# Patient Record
(demographics unavailable — no encounter records)

---

## 2024-10-17 NOTE — HISTORY OF PRESENT ILLNESS
[Post-hospitalization from ___ Hospital] : Post-hospitalization from [unfilled] Hospital [Admitted on: ___] : The patient was admitted on [unfilled] [Discharged on ___] : discharged on [unfilled] [Discharge Summary] : discharge summary [Discharge Med List] : discharge medication list [FreeTextEntry2] : recent admission pneumonia atrial fibrillation decline anti coag but on asa

## 2024-10-17 NOTE — PLAN
[FreeTextEntry1] : afibon asa complain of vision jaiden ges from low dose ,etoprolol will switch  to cardizem 30 bid f/u 4 week

## 2024-10-17 NOTE — HEALTH RISK ASSESSMENT
[Yes] : Yes [Monthly or less (1 pt)] : Monthly or less (1 point) [Never (0 pts)] : Never (0 points) [No falls in past year] : Patient reported no falls in the past year [0] : 2) Feeling down, depressed, or hopeless: Not at all (0) [PHQ-2 Negative - No further assessment needed] : PHQ-2 Negative - No further assessment needed [[id number]] : 0 [Change in mental status noted] : No change in mental status noted [Language] : denies difficulty with language [Behavior] : denies difficulty with behavior [Learning/Retaining New Information] : denies difficulty learning/retaining new information [Handling Complex Tasks] : denies difficulty handling complex tasks [Reasoning] : denies difficulty with reasoning [Spatial Ability and Orientation] : denies difficulty with spatial ability and orientation [None] : None [Alone] : lives alone [Retired] : retired [College] : College [] :  [Feels Safe at Home] : Feels safe at home [Fully functional (bathing, dressing, toileting, transferring, walking, feeding)] : Fully functional (bathing, dressing, toileting, transferring, walking, feeding) [Fully functional (using the telephone, shopping, preparing meals, housekeeping, doing laundry, using] : Fully functional and needs no help or supervision to perform IADLs (using the telephone, shopping, preparing meals, housekeeping, doing laundry, using transportation, managing medications and managing finances) [Reports changes in hearing] : Reports no changes in hearing [Reports changes in vision] : Reports no changes in vision [Reports changes in dental health] : Reports no changes in dental health [Smoke Detector] : smoke detector [Carbon Monoxide Detector] : carbon monoxide detector [Seat Belt] :  uses seat belt

## 2024-10-30 NOTE — PLAN
[FreeTextEntry1] : 6-19-24: Plan: BLE: wash with soap and water adaptic/xtrasorb/safia/ace 3x/week supplies ordered  will order home care  will order vascular testing (MARCUS/PVR, VLE, IVC scan)  The patient was counseled as to the signs and symptoms of infection, and instructed in the event they suspect new or worsening infection or if the patient is significantly ill (fever, altered mental status, etc.), to present to the nearest ED.  f/u 1 week   7-10-24: Plan: BLE: wash with soap and water adaptic/xtrasorb/safia/ace 3x/week Nursing orders given The patient was counseled as to the signs and symptoms of infection, and instructed in the event they suspect new or worsening infection or if the patient is significantly ill (fever, altered mental status, etc.), to present to the nearest ED.  for VLE and IVC scan f/u 2-3 weeks   7-19-24: Plan: wash with soap and water adaptic/drawtex/abd/safia/ace 3x/week Nursing orders given The patient was counseled as to the signs and symptoms of infection, and instructed in the event they suspect new or worsening infection or if the patient is significantly ill (fever, altered mental status, etc.), to present to the nearest ED.  Pt has appt scheduled for 8-7-24 8-7-24: Plan: Pt open to  vascular testing.  Will reorder.   will order vascular testing (VLE, IVC scan)  wash with soap and water adaptic/drawtex/abd/safia/ace 3x/week Nursing orders given The patient was counseled as to the signs and symptoms of infection, and instructed in the event they suspect new or worsening infection or if the patient is significantly ill (fever, altered mental status, etc.), to present to the nearest ED.  f/u 2-3 weeks   8/28/24 Plan: Vascular testing today for VLE IVC scheduled for next visit Wash with soap and water Moisturize dry scaly areas Adaptic/drawtex/abd/safia/ace 3x/week Nursing orders given for [facility]  10-30-24: Plan: BLE Wash with soap and water Moisturize dry scaly areas Adaptic/drawtex/abd/safia/ace 3x/week Nursing orders given for [facility] follow up in 3-4 weeks

## 2024-10-30 NOTE — PHYSICAL EXAM
[Ankle Swelling (On Exam)] : present [Ankle Swelling Bilaterally] : bilaterally  [Skin Ulcer] : ulcer [Alert] : alert [Oriented to Person] : oriented to person [Oriented to Place] : oriented to place [Oriented to Time] : oriented to time [Calm] : calm [Please See PDF for Tissue Analytics] : Please See PDF for Tissue Analytics. [1+] : left 1+ [de-identified] : NAD [de-identified] : AT [de-identified] : supple [de-identified] : equal chest rise [FreeTextEntry1] : right greater than left [de-identified] : FROM [de-identified] : bilateral lower extremity superficial ulcerative wounds with sloughing and dry flaky skin

## 2024-10-30 NOTE — HISTORY OF PRESENT ILLNESS
[FreeTextEntry1] : 6-19-24 Mr. GARLAND SLOAN   presents to the office with a wound for 6 months.  The wound is located on  the BLE.  The patient has complaints of non healing and drainage.   The patient has been dressing the wound with wash cloth and tape.  The patient denies fevers or chills.  The patient has localized pain to the wound upon dressing changes.  The patient has no other complaints or associated symptoms.   Pt treated with abx (doxy ) by PMD on 6-3-24. Pt has seen Dr. Mora's office ([facility]) last in 2-2024 and had VLE--  severe superficial venous insuff.  Recommended compression and elevation  Pt here for worsening wounds over last 6 months.   10-30-24 Pt presents for follow up for bilateral lower extremity wounds. He was recently hospitalized for pneumonia. He was seen by the wound care service there who recommended sonia to his wounds.

## 2024-11-13 NOTE — HISTORY OF PRESENT ILLNESS
[FreeTextEntry1] : f/u [de-identified] : chronic issue with leg goes to wound care on diuretic increase weeping and swelling afib did not like Cardizem does not like low dose metoprolol neuropathy control cymbalta 20

## 2024-11-13 NOTE — PHYSICAL EXAM
[Normal] : no respiratory distress, lungs were clear to auscultation bilaterally and no accessory muscle use [de-identified] : irregualr att 80 [de-identified] : 2 plus weeping edema

## 2024-11-13 NOTE — PLAN
[FreeTextEntry1] : stop metoprolol start dig 0.125 every other day no loading dose will check level and heart rate at next visit check renal function, potassium, magnesium continue water pills as is f/u wound care f/u 2 months

## 2024-11-20 NOTE — PLAN
[FreeTextEntry1] : 6-19-24: Plan: BLE: wash with soap and water adaptic/xtrasorb/safia/ace 3x/week supplies ordered  will order home care  will order vascular testing (MARCUS/PVR, VLE, IVC scan)  The patient was counseled as to the signs and symptoms of infection, and instructed in the event they suspect new or worsening infection or if the patient is significantly ill (fever, altered mental status, etc.), to present to the nearest ED.  f/u 1 week   7-10-24: Plan: BLE: wash with soap and water adaptic/xtrasorb/safia/ace 3x/week Nursing orders given The patient was counseled as to the signs and symptoms of infection, and instructed in the event they suspect new or worsening infection or if the patient is significantly ill (fever, altered mental status, etc.), to present to the nearest ED.  for VLE and IVC scan f/u 2-3 weeks   7-19-24: Plan: wash with soap and water adaptic/drawtex/abd/safia/ace 3x/week Nursing orders given The patient was counseled as to the signs and symptoms of infection, and instructed in the event they suspect new or worsening infection or if the patient is significantly ill (fever, altered mental status, etc.), to present to the nearest ED.  Pt has appt scheduled for 8-7-24 8-7-24: Plan: Pt open to  vascular testing.  Will reorder.   will order vascular testing (VLE, IVC scan)  wash with soap and water adaptic/drawtex/abd/safia/ace 3x/week Nursing orders given The patient was counseled as to the signs and symptoms of infection, and instructed in the event they suspect new or worsening infection or if the patient is significantly ill (fever, altered mental status, etc.), to present to the nearest ED.  f/u 2-3 weeks   8/28/24 Plan: Vascular testing today for VLE IVC scheduled for next visit Wash with soap and water Moisturize dry scaly areas Adaptic/drawtex/abd/safia/ace 3x/week Nursing orders given for [facility]  10-30-24: Plan: BLE Wash with soap and water Moisturize dry scaly areas Adaptic/drawtex/abd/safia/ace 3x/week Nursing orders given for [facility] follow up in 3-4 weeks  11-20-24: Plan: BLE cont current tx Wash with soap and water Moisturize dry scaly areas Adaptic/drawtex/abd/safia/ace 3x/week Nursing orders given for 3x/week  follow up in 3-4 weeks

## 2024-11-20 NOTE — PHYSICAL EXAM
[1+] : left 1+ [Ankle Swelling (On Exam)] : present [Ankle Swelling Bilaterally] : bilaterally  [Skin Ulcer] : ulcer [Alert] : alert [Oriented to Person] : oriented to person [Oriented to Place] : oriented to place [Oriented to Time] : oriented to time [Calm] : calm [Please See PDF for Tissue Analytics] : Please See PDF for [employer] Analytics. [de-identified] : NAD [de-identified] : AT [de-identified] : supple [de-identified] : equal chest rise [FreeTextEntry1] : right greater than left [de-identified] : FROM [de-identified] : bilateral lower extremities with scattered superficial areas of denuded skin. dry flaky skin.

## 2024-11-20 NOTE — HISTORY OF PRESENT ILLNESS
[FreeTextEntry1] : 6-19-24 Mr. GARLAND SLOAN   presents to the office with a wound for 6 months.  The wound is located on  the BLE.  The patient has complaints of non healing and drainage.   The patient has been dressing the wound with wash cloth and tape.  The patient denies fevers or chills.  The patient has localized pain to the wound upon dressing changes.  The patient has no other complaints or associated symptoms.   Pt treated with abx (doxy ) by PMD on 6-3-24. Pt has seen Dr. Mora's office ([facility]) last in 2-2024 and had VLE--  severe superficial venous insuff.  Recommended compression and elevation  Pt here for worsening wounds over last 6 months.   10-30-24 Pt presents for follow up for bilateral lower extremity wounds. He was recently hospitalized for pneumonia. He was seen by the wound care service there who recommended sonia to his wounds.   11-20-24 Pt presents for follow up of BLE wounds. He states that the nurses have only been coming twice a week. No new complaints.

## 2024-11-20 NOTE — ASSESSMENT
[FreeTextEntry1] : 6-19-24: Pt her for BLE wounds On exam: BLE edema BLE wounds with slough and erythema in gator region.  s/p mechanical debridement s/p excisional debridement of LLE No periwound warmth, tenderness, induration, fluctuance or crepitus.   7-10-24: Pt here for f/u No new complaints Pt had vascular testing today.  Dr. Martins discussed results with pt today.  Has home care 3x/week On exam: BLE edema BLE wounds: superficial wounds with scaly skin. s/p mechanical debridement No s/s of infection.   7-19-24: Pt here for f/u Pt says dressings are wet before due for next nurse visit. Has home care 3x/week Pt cancelled VLE today-- said he is having too many tests and does not want it done.  Explained to pt that the test is necessary to help best guide the care and management of his leg wounds.  Pt still refuses exam (VLE) On exam: BLE: superficial wounds with dry scaling skin.  s/p mechanical debridement. stable erythema w/o warmth, tenderness, induration, fluctuance or crepitus.  calves soft NT  8-7-24: Pt here for f/u Pt has complaint of drainage. Pt  has a new nurse 3x/week.  Dressing on today was not as ordered. Soaked through socks On exam: BLE: superficial wounds with dry scaling skin. s/p mechanical debridement  Stable erythema .No periwound  warmth, tenderness, induration, fluctuance or crepitus.   8/28/24 Patient presents for follow up. Patient reports he is on a new diuretic. On exam: Lower extremity edema improved BLE:  superficial wounds with large amount of serous exudate dry scaling and flaking skin.    Stable erythema w/o warmth, tenderness, induration, fluctuance or crepitus.  s/p mechanical debridement of dry scaly skin  Wounds washed with soap and water Moisturizer applied  10-30-24 Pt here for f/u No new complaints.  Recently hospitalized for SOB.  t says he had PNA  Pt had vascular testing today (IVC).  Dr. Martins will call pt with results.  On exam: bilateral lower extremity superficial ulcerative wounds with sloughing and xerosis. macerated ay ankles.  s/p mechanical debridement. No s/s of infection  11-20-24: Pt here for f/u No new complaints States the home nurse only comes twice a week On exam: bilateral lower extremities continue with scattered superficial areas of denuded skin. dry flaky skin. S/p mechanical debridement. No s/s of infection

## 2024-12-18 NOTE — HISTORY OF PRESENT ILLNESS
[FreeTextEntry1] : 6-19-24 Mr. GARLAND SLOAN   presents to the office with a wound for 6 months.  The wound is located on  the BLE.  The patient has complaints of non healing and drainage.   The patient has been dressing the wound with wash cloth and tape.  The patient denies fevers or chills.  The patient has localized pain to the wound upon dressing changes.  The patient has no other complaints or associated symptoms.   Pt treated with abx (doxy ) by PMD on 6-3-24. Pt has seen Dr. Mora's office ([facility]) last in 2-2024 and had VLE--  severe superficial venous insuff.  Recommended compression and elevation  Pt here for worsening wounds over last 6 months.   10-30-24 Pt presents for follow up for bilateral lower extremity wounds. He was recently hospitalized for pneumonia. He was seen by the wound care service there who recommended sonia to his wounds.   11-20-24 Pt presents for follow up of BLE wounds. He states that the nurses have only been coming twice a week. No new complaints.

## 2024-12-18 NOTE — ASSESSMENT
[FreeTextEntry1] : 6-19-24: Pt her for BLE wounds On exam: BLE edema BLE wounds with slough and erythema in gator region.  s/p mechanical debridement s/p excisional debridement of LLE No periwound warmth, tenderness, induration, fluctuance or crepitus.   7-10-24: Pt here for f/u No new complaints Pt had vascular testing today.  Dr. Martins discussed results with pt today.  Has home care 3x/week On exam: BLE edema BLE wounds: superficial wounds with scaly skin. s/p mechanical debridement No s/s of infection.   7-19-24: Pt here for f/u Pt says dressings are wet before due for next nurse visit. Has home care 3x/week Pt cancelled VLE today-- said he is having too many tests and does not want it done.  Explained to pt that the test is necessary to help best guide the care and management of his leg wounds.  Pt still refuses exam (VLE) On exam: BLE: superficial wounds with dry scaling skin.  s/p mechanical debridement. stable erythema w/o warmth, tenderness, induration, fluctuance or crepitus.  calves soft NT  8-7-24: Pt here for f/u Pt has complaint of drainage. Pt  has a new nurse 3x/week.  Dressing on today was not as ordered. Soaked through socks On exam: BLE: superficial wounds with dry scaling skin. s/p mechanical debridement  Stable erythema .No periwound  warmth, tenderness, induration, fluctuance or crepitus.   8/28/24 Patient presents for follow up. Patient reports he is on a new diuretic. On exam: Lower extremity edema improved BLE:  superficial wounds with large amount of serous exudate dry scaling and flaking skin.    Stable erythema w/o warmth, tenderness, induration, fluctuance or crepitus.  s/p mechanical debridement of dry scaly skin  Wounds washed with soap and water Moisturizer applied  10-30-24 Pt here for f/u No new complaints.  Recently hospitalized for SOB.  t says he had PNA  Pt had vascular testing today (IVC).  Dr. Martins will call pt with results.  On exam: bilateral lower extremity superficial ulcerative wounds with sloughing and xerosis. macerated ay ankles.  s/p mechanical debridement. No s/s of infection  11-20-24: Pt here for f/u No new complaints States the home nurse only comes twice a week On exam: bilateral lower extremities continue with scattered superficial areas of denuded skin. dry flaky skin. S/p mechanical debridement. No s/s of infection  12-18-24: Pt here for f/u No new complaints Pt has nurse 2x/week.  telfa being used instead of adaptic On exam: BLE: superficial wounds with dry scaly skin.  s/p mechanical debridement No s/s of infection.

## 2024-12-18 NOTE — PLAN
[FreeTextEntry1] : 6-19-24: Plan: BLE: wash with soap and water adaptic/xtrasorb/safia/ace 3x/week supplies ordered  will order home care  will order vascular testing (MARCUS/PVR, VLE, IVC scan)  The patient was counseled as to the signs and symptoms of infection, and instructed in the event they suspect new or worsening infection or if the patient is significantly ill (fever, altered mental status, etc.), to present to the nearest ED.  f/u 1 week   7-10-24: Plan: BLE: wash with soap and water adaptic/xtrasorb/safia/ace 3x/week Nursing orders given The patient was counseled as to the signs and symptoms of infection, and instructed in the event they suspect new or worsening infection or if the patient is significantly ill (fever, altered mental status, etc.), to present to the nearest ED.  for VLE and IVC scan f/u 2-3 weeks   7-19-24: Plan: wash with soap and water adaptic/drawtex/abd/safia/ace 3x/week Nursing orders given The patient was counseled as to the signs and symptoms of infection, and instructed in the event they suspect new or worsening infection or if the patient is significantly ill (fever, altered mental status, etc.), to present to the nearest ED.  Pt has appt scheduled for 8-7-24 8-7-24: Plan: Pt open to  vascular testing.  Will reorder.   will order vascular testing (VLE, IVC scan)  wash with soap and water adaptic/drawtex/abd/safia/ace 3x/week Nursing orders given The patient was counseled as to the signs and symptoms of infection, and instructed in the event they suspect new or worsening infection or if the patient is significantly ill (fever, altered mental status, etc.), to present to the nearest ED.  f/u 2-3 weeks   8/28/24 Plan: Vascular testing today for VLE IVC scheduled for next visit Wash with soap and water Moisturize dry scaly areas Adaptic/drawtex/abd/safia/ace 3x/week Nursing orders given for [facility]  10-30-24: Plan: BLE Wash with soap and water Moisturize dry scaly areas Adaptic/drawtex/abd/safia/ace 3x/week Nursing orders given for [facility] follow up in 3-4 weeks  11-20-24: Plan: BLE cont current tx Wash with soap and water Moisturize dry scaly areas Adaptic/drawtex/abd/safia/ace 3x/week Nursing orders given for 3x/week  follow up in 3-4 weeks  12-18-24: Plan: BLE cont current tx Wash with soap and water Xerosis: Moisturize dry scaly areas.  ammonium lactate ordered to pharmacy Adaptic/drawtex/abd/safia/ace 3x/week Nursing orders given for 3x/week  follow up in 3-4 weeks

## 2024-12-18 NOTE — PHYSICAL EXAM
[1+] : left 1+ [Ankle Swelling (On Exam)] : present [Ankle Swelling Bilaterally] : bilaterally  [Skin Ulcer] : ulcer [Alert] : alert [Oriented to Person] : oriented to person [Oriented to Place] : oriented to place [Oriented to Time] : oriented to time [Calm] : calm [Please See PDF for Tissue Analytics] : Please See PDF for [employer] Analytics. [de-identified] : NAD [de-identified] : AT [de-identified] : supple [FreeTextEntry1] : right greater than left [de-identified] : equal chest rise [de-identified] : FROM

## 2025-01-15 NOTE — PLAN
[FreeTextEntry1] : 6-19-24: Plan: BLE: wash with soap and water adaptic/xtrasorb/safia/ace 3x/week supplies ordered  will order home care  will order vascular testing (MARCUS/PVR, VLE, IVC scan)  The patient was counseled as to the signs and symptoms of infection, and instructed in the event they suspect new or worsening infection or if the patient is significantly ill (fever, altered mental status, etc.), to present to the nearest ED.  f/u 1 week   7-10-24: Plan: BLE: wash with soap and water adaptic/xtrasorb/safia/ace 3x/week Nursing orders given The patient was counseled as to the signs and symptoms of infection, and instructed in the event they suspect new or worsening infection or if the patient is significantly ill (fever, altered mental status, etc.), to present to the nearest ED.  for VLE and IVC scan f/u 2-3 weeks   7-19-24: Plan: wash with soap and water adaptic/drawtex/abd/safia/ace 3x/week Nursing orders given The patient was counseled as to the signs and symptoms of infection, and instructed in the event they suspect new or worsening infection or if the patient is significantly ill (fever, altered mental status, etc.), to present to the nearest ED.  Pt has appt scheduled for 8-7-24 8-7-24: Plan: Pt open to  vascular testing.  Will reorder.   will order vascular testing (VLE, IVC scan)  wash with soap and water adaptic/drawtex/abd/safia/ace 3x/week Nursing orders given The patient was counseled as to the signs and symptoms of infection, and instructed in the event they suspect new or worsening infection or if the patient is significantly ill (fever, altered mental status, etc.), to present to the nearest ED.  f/u 2-3 weeks   8/28/24 Plan: Vascular testing today for VLE IVC scheduled for next visit Wash with soap and water Moisturize dry scaly areas Adaptic/drawtex/abd/safia/ace 3x/week Nursing orders given for [facility]  10-30-24: Plan: BLE Wash with soap and water Moisturize dry scaly areas Adaptic/drawtex/abd/safia/ace 3x/week Nursing orders given for [facility] follow up in 3-4 weeks  11-20-24: Plan: BLE cont current tx Wash with soap and water Moisturize dry scaly areas Adaptic/drawtex/abd/safia/ace 3x/week Nursing orders given for 3x/week  follow up in 3-4 weeks  12-18-24: Plan: BLE cont current tx Wash with soap and water Xerosis: Moisturize dry scaly areas.  ammonium lactate ordered to pharmacy Adaptic/drawtex/abd/safia/ace 3x/week Nursing orders given for 3x/week  follow up in 3-4 weeks  1-15-25: Plan: BLE wash with soap and water Moisturize intact skin. Do not put between toes.  adaptic/aquacel/gauze/safia/ace 3x/week Nursing orders given will get in contact with homecare f/u 3-4 weeks

## 2025-01-15 NOTE — HISTORY OF PRESENT ILLNESS
[FreeTextEntry1] : 6-19-24 Mr. GARLAND SLOAN   presents to the office with a wound for 6 months.  The wound is located on  the BLE.  The patient has complaints of non healing and drainage.   The patient has been dressing the wound with wash cloth and tape.  The patient denies fevers or chills.  The patient has localized pain to the wound upon dressing changes.  The patient has no other complaints or associated symptoms.   Pt treated with abx (doxy ) by PMD on 6-3-24. Pt has seen Dr. Mora's office ([facility]) last in 2-2024 and had VLE--  severe superficial venous insuff.  Recommended compression and elevation  Pt here for worsening wounds over last 6 months.   10-30-24 Pt presents for follow up for bilateral lower extremity wounds. He was recently hospitalized for pneumonia. He was seen by the wound care service there who recommended sonia to his wounds.   11-20-24 Pt presents for follow up of BLE wounds. He states that the nurses have only been coming twice a week. No new complaints.

## 2025-01-15 NOTE — PHYSICAL EXAM
[1+] : left 1+ [Ankle Swelling (On Exam)] : present [Ankle Swelling Bilaterally] : bilaterally  [Skin Ulcer] : ulcer [Alert] : alert [Oriented to Person] : oriented to person [Oriented to Place] : oriented to place [Oriented to Time] : oriented to time [Calm] : calm [Please See PDF for Tissue Analytics] : Please See PDF for [employer] Analytics. [de-identified] : NAD [de-identified] : AT [de-identified] : supple [de-identified] : equal chest rise [FreeTextEntry1] : right greater than left [de-identified] : FROM

## 2025-01-15 NOTE — ASSESSMENT
[FreeTextEntry1] : 6-19-24: Pt her for BLE wounds On exam: BLE edema BLE wounds with slough and erythema in gator region.  s/p mechanical debridement s/p excisional debridement of LLE No periwound warmth, tenderness, induration, fluctuance or crepitus.   7-10-24: Pt here for f/u No new complaints Pt had vascular testing today.  Dr. Martins discussed results with pt today.  Has home care 3x/week On exam: BLE edema BLE wounds: superficial wounds with scaly skin. s/p mechanical debridement No s/s of infection.   7-19-24: Pt here for f/u Pt says dressings are wet before due for next nurse visit. Has home care 3x/week Pt cancelled VLE today-- said he is having too many tests and does not want it done.  Explained to pt that the test is necessary to help best guide the care and management of his leg wounds.  Pt still refuses exam (VLE) On exam: BLE: superficial wounds with dry scaling skin.  s/p mechanical debridement. stable erythema w/o warmth, tenderness, induration, fluctuance or crepitus.  calves soft NT  8-7-24: Pt here for f/u Pt has complaint of drainage. Pt  has a new nurse 3x/week.  Dressing on today was not as ordered. Soaked through socks On exam: BLE: superficial wounds with dry scaling skin. s/p mechanical debridement  Stable erythema .No periwound  warmth, tenderness, induration, fluctuance or crepitus.   8/28/24 Patient presents for follow up. Patient reports he is on a new diuretic. On exam: Lower extremity edema improved BLE:  superficial wounds with large amount of serous exudate dry scaling and flaking skin.    Stable erythema w/o warmth, tenderness, induration, fluctuance or crepitus.  s/p mechanical debridement of dry scaly skin  Wounds washed with soap and water Moisturizer applied  10-30-24 Pt here for f/u No new complaints.  Recently hospitalized for SOB.  t says he had PNA  Pt had vascular testing today ([facility]C).  Dr. Martins will call pt with results.  On exam: bilateral lower extremity superficial ulcerative wounds with sloughing and xerosis. macerated ay ankles.  s/p mechanical debridement. No s/s of infection  11-20-24: Pt here for f/u No new complaints States the home nurse only comes twice a week On exam: bilateral lower extremities continue with scattered superficial areas of denuded skin. dry flaky skin. S/p mechanical debridement. No s/s of infection  12-18-24: Pt here for f/u No new complaints Pt has nurse 2x/week.  telfa being used instead of adaptic On exam: BLE: superficial wounds with dry scaly skin.  s/p mechanical debridement No s/s of infection.   1-15-25: Pt here for f/u No new complaints Pt has nurse 2x/week.   Pt says nurse told him the supplies arent covered any more. using adaptic/abd/safia/ace On exam: BLE: superficial wounds with dry scaly skin.  s/p mechanical debridement No s/s of infection. less drainage and less dry skin

## 2025-01-16 NOTE — HEALTH RISK ASSESSMENT
[Good] : ~his/her~  mood as  good [Yes] : Yes [Monthly or less (1 pt)] : Monthly or less (1 point) [Never (0 pts)] : Never (0 points) [No falls in past year] : Patient reported no falls in the past year [0] : 2) Feeling down, depressed, or hopeless: Not at all (0) [PHQ-2 Negative - No further assessment needed] : PHQ-2 Negative - No further assessment needed [Former] : Former [NO] : No [None] : None [Alone] : lives alone [Retired] : retired [High School] : high school [] :  [Feels Safe at Home] : Feels safe at home [Fully functional (bathing, dressing, toileting, transferring, walking, feeding)] : Fully functional (bathing, dressing, toileting, transferring, walking, feeding) [Independent] : managing medications [Some assistance needed] : managing finances [Full assistance needed] : using transportation [Smoke Detector] : smoke detector [Carbon Monoxide Detector] : carbon monoxide detector [Seat Belt] :  uses seat belt [[id number]] : 0 [Change in mental status noted] : No change in mental status noted [Language] : denies difficulty with language [Behavior] : denies difficulty with behavior [Learning/Retaining New Information] : denies difficulty learning/retaining new information [Handling Complex Tasks] : denies difficulty handling complex tasks [Reasoning] : denies difficulty with reasoning [Reports changes in hearing] : Reports no changes in hearing [Reports changes in vision] : Reports no changes in vision [Reports changes in dental health] : Reports no changes in dental health

## 2025-01-16 NOTE — PHYSICAL EXAM
[Normal] : no respiratory distress, lungs were clear to auscultation bilaterally and no accessory muscle use [de-identified] : afib at 90 [de-identified] : 1 plus edema legs wrapped

## 2025-01-16 NOTE — PLAN
[FreeTextEntry1] : Annual Had flu shot  edma and issue with legs improved strenght improved on better diet  to check cbc, iron and tib  and ferritin afib rate controlled check renal funcion and potassium continue meds as is f/u 3 months

## 2025-01-16 NOTE — HISTORY OF PRESENT ILLNESS
[de-identified] : Annual Had flu shot  eating better, some iron rich foods and feel better Leg with wound care on diruetic and wraps less swelling no cp or sob afib rate controlled with dig decline atnit coag with risk

## 2025-03-12 NOTE — PHYSICAL EXAM
[1+] : left 1+ [Ankle Swelling (On Exam)] : present [Ankle Swelling Bilaterally] : bilaterally  [Skin Ulcer] : ulcer [Alert] : alert [Oriented to Person] : oriented to person [Oriented to Place] : oriented to place [Oriented to Time] : oriented to time [Calm] : calm [Please See PDF for Tissue Analytics] : Please See PDF for [employer] Analytics. [de-identified] : NAD [de-identified] : AT [de-identified] : supple [de-identified] : equal chest rise [FreeTextEntry1] : right greater than left [de-identified] : FROM

## 2025-03-12 NOTE — PLAN
[FreeTextEntry1] : 6-19-24: Plan: BLE: wash with soap and water adaptic/xtrasorb/safia/ace 3x/week supplies ordered  will order home care  will order vascular testing (MARCUS/PVR, VLE, IVC scan)  The patient was counseled as to the signs and symptoms of infection, and instructed in the event they suspect new or worsening infection or if the patient is significantly ill (fever, altered mental status, etc.), to present to the nearest ED.  f/u 1 week   7-10-24: Plan: BLE: wash with soap and water adaptic/xtrasorb/safia/ace 3x/week Nursing orders given The patient was counseled as to the signs and symptoms of infection, and instructed in the event they suspect new or worsening infection or if the patient is significantly ill (fever, altered mental status, etc.), to present to the nearest ED.  for VLE and IVC scan f/u 2-3 weeks   7-19-24: Plan: wash with soap and water adaptic/drawtex/abd/safia/ace 3x/week Nursing orders given The patient was counseled as to the signs and symptoms of infection, and instructed in the event they suspect new or worsening infection or if the patient is significantly ill (fever, altered mental status, etc.), to present to the nearest ED.  Pt has appt scheduled for 8-7-24 8-7-24: Plan: Pt open to  vascular testing.  Will reorder.   will order vascular testing (VLE, IVC scan)  wash with soap and water adaptic/drawtex/abd/safia/ace 3x/week Nursing orders given The patient was counseled as to the signs and symptoms of infection, and instructed in the event they suspect new or worsening infection or if the patient is significantly ill (fever, altered mental status, etc.), to present to the nearest ED.  f/u 2-3 weeks   8/28/24 Plan: Vascular testing today for VLE IVC scheduled for next visit Wash with soap and water Moisturize dry scaly areas Adaptic/drawtex/abd/safia/ace 3x/week Nursing orders given for [facility]  10-30-24: Plan: BLE Wash with soap and water Moisturize dry scaly areas Adaptic/drawtex/abd/safia/ace 3x/week Nursing orders given for [facility] follow up in 3-4 weeks  11-20-24: Plan: BLE cont current tx Wash with soap and water Moisturize dry scaly areas Adaptic/drawtex/abd/safia/ace 3x/week Nursing orders given for 3x/week  follow up in 3-4 weeks  12-18-24: Plan: BLE cont current tx Wash with soap and water Xerosis: Moisturize dry scaly areas.  ammonium lactate ordered to pharmacy Adaptic/drawtex/abd/safia/ace 3x/week Nursing orders given for 3x/week  follow up in 3-4 weeks  1-15-25: Plan: BLE wash with soap and water Moisturize intact skin. Do not put between toes.  adaptic/aquacel/gauze/safia/ace 3x/week Nursing orders given will get in contact with homecare f/u 3-4 weeks   3-12-25: Plan: BLE BLE wash with soap and water Moisturize intact skin. Do not put between toes.  adaptic/aquacel/gauze/safia/ace 2x/week Nursing orders given Pt does not want compression stockings.  f/u 2-3 weeks

## 2025-03-12 NOTE — HISTORY OF PRESENT ILLNESS
[FreeTextEntry1] : 6-19-24 Mr. GARLAND SLOAN   presents to the office with a wound for 6 months.  The wound is located on  the BLE.  The patient has complaints of non healing and drainage.   The patient has been dressing the wound with wash cloth and tape.  The patient denies fevers or chills.  The patient has localized pain to the wound upon dressing changes.  The patient has no other complaints or associated symptoms.   Pt treated with abx (doxy ) by PMD on 6-3-24. Pt has seen Dr. Mora's office ([facility]) last in 2-2024 and had VLE--  severe superficial venous insuff.  Recommended compression and elevation  Pt here for worsening wounds over last 6 months.   10-30-24 Pt presents for follow up for bilateral lower extremity wounds. He was recently hospitalized for pneumonia. He was seen by the wound care service there who recommended sonia to his wounds.   11-20-24 Pt presents for follow up of BLE wounds. He states that the nurses have only been coming twice a week. No new complaints.

## 2025-03-12 NOTE — ASSESSMENT
[FreeTextEntry1] : 6-19-24: Pt her for BLE wounds On exam: BLE edema BLE wounds with slough and erythema in gator region.  s/p mechanical debridement s/p excisional debridement of LLE No periwound warmth, tenderness, induration, fluctuance or crepitus.   7-10-24: Pt here for f/u No new complaints Pt had vascular testing today.  Dr. Martins discussed results with pt today.  Has home care 3x/week On exam: BLE edema BLE wounds: superficial wounds with scaly skin. s/p mechanical debridement No s/s of infection.   7-19-24: Pt here for f/u Pt says dressings are wet before due for next nurse visit. Has home care 3x/week Pt cancelled VLE today-- said he is having too many tests and does not want it done.  Explained to pt that the test is necessary to help best guide the care and management of his leg wounds.  Pt still refuses exam (VLE) On exam: BLE: superficial wounds with dry scaling skin.  s/p mechanical debridement. stable erythema w/o warmth, tenderness, induration, fluctuance or crepitus.  calves soft NT  8-7-24: Pt here for f/u Pt has complaint of drainage. Pt  has a new nurse 3x/week.  Dressing on today was not as ordered. Soaked through socks On exam: BLE: superficial wounds with dry scaling skin. s/p mechanical debridement  Stable erythema .No periwound  warmth, tenderness, induration, fluctuance or crepitus.   8/28/24 Patient presents for follow up. Patient reports he is on a new diuretic. On exam: Lower extremity edema improved BLE:  superficial wounds with large amount of serous exudate dry scaling and flaking skin.    Stable erythema w/o warmth, tenderness, induration, fluctuance or crepitus.  s/p mechanical debridement of dry scaly skin  Wounds washed with soap and water Moisturizer applied  10-30-24 Pt here for f/u No new complaints.  Recently hospitalized for SOB.  t says he had PNA  Pt had vascular testing today ([facility]C).  Dr. Martins will call pt with results.  On exam: bilateral lower extremity superficial ulcerative wounds with sloughing and xerosis. macerated ay ankles.  s/p mechanical debridement. No s/s of infection  11-20-24: Pt here for f/u No new complaints States the home nurse only comes twice a week On exam: bilateral lower extremities continue with scattered superficial areas of denuded skin. dry flaky skin. S/p mechanical debridement. No s/s of infection  12-18-24: Pt here for f/u No new complaints Pt has nurse 2x/week.  telfa being used instead of adaptic On exam: BLE: superficial wounds with dry scaly skin.  s/p mechanical debridement No s/s of infection.   1-15-25: Pt here for f/u No new complaints Pt has nurse 2x/week.   Pt says nurse told him the supplies arent covered any more. using adaptic/abd/safia/ace On exam: BLE: superficial wounds with dry scaly skin.  s/p mechanical debridement No s/s of infection. less drainage and less dry skin  3-12-25: Pt here for f/u No new complaints Pt has nurse 2x/week  On exam: BLE with dry flaking skin RLE medial ankle with small superficial wound. s/p mechanical debridement No s/s of infection.  LLE (shin) with superficial wound. s/p mechanical debridement No s/s of infection.

## 2025-04-09 NOTE — END OF VISIT
[Time Spent: ___ minutes] : I have spent [unfilled] minutes of time on the encounter which excludes teaching and separately reported services.
never

## 2025-04-09 NOTE — PLAN
[FreeTextEntry1] : 6-19-24: Plan: BLE: wash with soap and water adaptic/xtrasorb/safia/ace 3x/week supplies ordered  will order home care  will order vascular testing (MARCUS/PVR, VLE, IVC scan)  The patient was counseled as to the signs and symptoms of infection, and instructed in the event they suspect new or worsening infection or if the patient is significantly ill (fever, altered mental status, etc.), to present to the nearest ED.  f/u 1 week   7-10-24: Plan: BLE: wash with soap and water adaptic/xtrasorb/safai/ace 3x/week Nursing orders given The patient was counseled as to the signs and symptoms of infection, and instructed in the event they suspect new or worsening infection or if the patient is significantly ill (fever, altered mental status, etc.), to present to the nearest ED.  for VLE and IVC scan f/u 2-3 weeks   7-19-24: Plan: wash with soap and water adaptic/drawtex/abd/safia/ace 3x/week Nursing orders given The patient was counseled as to the signs and symptoms of infection, and instructed in the event they suspect new or worsening infection or if the patient is significantly ill (fever, altered mental status, etc.), to present to the nearest ED.  Pt has appt scheduled for 8-7-24 8-7-24: Plan: Pt open to  vascular testing.  Will reorder.   will order vascular testing (VLE, IVC scan)  wash with soap and water adaptic/drawtex/abd/safia/ace 3x/week Nursing orders given The patient was counseled as to the signs and symptoms of infection, and instructed in the event they suspect new or worsening infection or if the patient is significantly ill (fever, altered mental status, etc.), to present to the nearest ED.  f/u 2-3 weeks   8/28/24 Plan: Vascular testing today for VLE IVC scheduled for next visit Wash with soap and water Moisturize dry scaly areas Adaptic/drawtex/abd/safia/ace 3x/week Nursing orders given for [facility]  10-30-24: Plan: BLE Wash with soap and water Moisturize dry scaly areas Adaptic/drawtex/abd/safia/ace 3x/week Nursing orders given for [facility] follow up in 3-4 weeks  11-20-24: Plan: BLE cont current tx Wash with soap and water Moisturize dry scaly areas Adaptic/drawtex/abd/safia/ace 3x/week Nursing orders given for 3x/week  follow up in 3-4 weeks  12-18-24: Plan: BLE cont current tx Wash with soap and water Xerosis: Moisturize dry scaly areas.  ammonium lactate ordered to pharmacy Adaptic/drawtex/abd/safia/ace 3x/week Nursing orders given for 3x/week  follow up in 3-4 weeks  1-15-25: Plan: BLE wash with soap and water Moisturize intact skin. Do not put between toes.  adaptic/aquacel/gauze/safia/ace 3x/week Nursing orders given will get in contact with homecare f/u 3-4 weeks   3-12-25: Plan: BLE BLE wash with soap and water Moisturize intact skin. Do not put between toes.  adaptic/aquacel/gauze/safia/ace 2x/week Nursing orders given Pt does not want compression stockings.  f/u 2-3 weeks   4-9-35: Plan: BLE wounds:  wash with soap and water Moisturize intact skin. Do not put between toes.  adaptic/aquacel/gauze/safia/ace 2x/week Nursing orders given f/u 3-4 weeks

## 2025-04-09 NOTE — ASSESSMENT
[FreeTextEntry1] : 6-19-24: Pt her for BLE wounds On exam: BLE edema BLE wounds with slough and erythema in gator region.  s/p mechanical debridement s/p excisional debridement of LLE No periwound warmth, tenderness, induration, fluctuance or crepitus.   7-10-24: Pt here for f/u No new complaints Pt had vascular testing today.  Dr. Martins discussed results with pt today.  Has home care 3x/week On exam: BLE edema BLE wounds: superficial wounds with scaly skin. s/p mechanical debridement No s/s of infection.   7-19-24: Pt here for f/u Pt says dressings are wet before due for next nurse visit. Has home care 3x/week Pt cancelled VLE today-- said he is having too many tests and does not want it done.  Explained to pt that the test is necessary to help best guide the care and management of his leg wounds.  Pt still refuses exam (VLE) On exam: BLE: superficial wounds with dry scaling skin.  s/p mechanical debridement. stable erythema w/o warmth, tenderness, induration, fluctuance or crepitus.  calves soft NT  8-7-24: Pt here for f/u Pt has complaint of drainage. Pt  has a new nurse 3x/week.  Dressing on today was not as ordered. Soaked through socks On exam: BLE: superficial wounds with dry scaling skin. s/p mechanical debridement  Stable erythema .No periwound  warmth, tenderness, induration, fluctuance or crepitus.   8/28/24 Patient presents for follow up. Patient reports he is on a new diuretic. On exam: Lower extremity edema improved BLE:  superficial wounds with large amount of serous exudate dry scaling and flaking skin.    Stable erythema w/o warmth, tenderness, induration, fluctuance or crepitus.  s/p mechanical debridement of dry scaly skin  Wounds washed with soap and water Moisturizer applied  10-30-24 Pt here for f/u No new complaints.  Recently hospitalized for SOB.  t says he had PNA  Pt had vascular testing today (IVC).  Dr. Martins will call pt with results.  On exam: bilateral lower extremity superficial ulcerative wounds with sloughing and xerosis. macerated ay ankles.  s/p mechanical debridement. No s/s of infection  11-20-24: Pt here for f/u No new complaints States the home nurse only comes twice a week On exam: bilateral lower extremities continue with scattered superficial areas of denuded skin. dry flaky skin. S/p mechanical debridement. No s/s of infection  12-18-24: Pt here for f/u No new complaints Pt has nurse 2x/week.  telfa being used instead of adaptic On exam: BLE: superficial wounds with dry scaly skin.  s/p mechanical debridement No s/s of infection.   1-15-25: Pt here for f/u No new complaints Pt has nurse 2x/week.   Pt says nurse told him the supplies arent covered any more. using adaptic/abd/safia/ace On exam: BLE: superficial wounds with dry scaly skin.  s/p mechanical debridement No s/s of infection. less drainage and less dry skin  3-12-25: Pt here for f/u No new complaints Pt has nurse 2x/week  On exam: BLE with dry flaking skin RLE medial ankle with small superficial wound. s/p mechanical debridement No s/s of infection.  LLE (shin) with superficial wound. s/p mechanical debridement No s/s of infection.   4-9-25: Pt here for f/u No new complaints Has home 2x/week On exam: BLE:  hyperkeratotic skin with superficial wounds.  s/p mechanical debridement No s/s of infection.

## 2025-04-09 NOTE — HISTORY OF PRESENT ILLNESS
[FreeTextEntry1] : 6-19-24 Mr. GARLAND SLOAN   presents to the office with a wound for 6 months.  The wound is located on  the BLE.  The patient has complaints of non healing and drainage.   The patient has been dressing the wound with wash cloth and tape.  The patient denies fevers or chills.  The patient has localized pain to the wound upon dressing changes.  The patient has no other complaints or associated symptoms.   Pt treated with abx (doxy ) by PMD on 6-3-24. Pt has seen Dr. Mora's office ([facility]) last in 2-2024 and had VLE--  severe superficial venous insuff.  Recommended compression and elevation  Pt here for worsening wounds over last 6 months.   10-30-24 Pt presents for follow up for bilateral lower extremity wounds. He was recently hospitalized for pneumonia. He was seen by the wound care service there who recommended sonia to his wounds.   11-20-24 Pt presents for follow up of BLE wounds. He states that the nurses have only been coming twice a week. No new complaints.

## 2025-04-09 NOTE — PHYSICAL EXAM
[1+] : left 1+ [Ankle Swelling (On Exam)] : present [Ankle Swelling Bilaterally] : bilaterally  [Skin Ulcer] : ulcer [Alert] : alert [Oriented to Person] : oriented to person [Oriented to Place] : oriented to place [Oriented to Time] : oriented to time [Calm] : calm [Please See PDF for Tissue Analytics] : Please See PDF for [employer] Analytics. [de-identified] : NAD [de-identified] : AT [de-identified] : supple [de-identified] : equal chest rise [FreeTextEntry1] : right greater than left [de-identified] : FROM

## 2025-04-17 NOTE — HISTORY OF PRESENT ILLNESS
[FreeTextEntry1] : f/u [de-identified] : on bumex 2 mg for leg edema much improved weeping of leg improve d with local care wound issue vns 2 x per week and wound care monthly no cp some exertional sob

## 2025-04-17 NOTE — REVIEW OF SYSTEMS
[Cough] : no cough [Dyspnea on Exertion] : dyspnea on exertion [Joint Pain] : no joint pain [Back Pain] : no back pain [Negative] : Gastrointestinal

## 2025-04-17 NOTE — PLAN
[FreeTextEntry1] : afib rate controlled  decline anti coag becasue of risk bp good edema improved legs wrapped check renql fucntin and lytes  has been strained on increased doses of water pills brings up mucous better dairy free check cbc has been low will restart azelastin

## 2025-06-04 NOTE — ASSESSMENT
[FreeTextEntry1] : 6-19-24: Pt her for BLE wounds On exam: BLE edema BLE wounds with slough and erythema in gator region.  s/p mechanical debridement s/p excisional debridement of LLE No periwound warmth, tenderness, induration, fluctuance or crepitus.   7-10-24: Pt here for f/u No new complaints Pt had vascular testing today.  Dr. Martins discussed results with pt today.  Has home care 3x/week On exam: BLE edema BLE wounds: superficial wounds with scaly skin. s/p mechanical debridement No s/s of infection.   7-19-24: Pt here for f/u Pt says dressings are wet before due for next nurse visit. Has home care 3x/week Pt cancelled VLE today-- said he is having too many tests and does not want it done.  Explained to pt that the test is necessary to help best guide the care and management of his leg wounds.  Pt still refuses exam (VLE) On exam: BLE: superficial wounds with dry scaling skin.  s/p mechanical debridement. stable erythema w/o warmth, tenderness, induration, fluctuance or crepitus.  calves soft NT  8-7-24: Pt here for f/u Pt has complaint of drainage. Pt  has a new nurse 3x/week.  Dressing on today was not as ordered. Soaked through socks On exam: BLE: superficial wounds with dry scaling skin. s/p mechanical debridement  Stable erythema .No periwound  warmth, tenderness, induration, fluctuance or crepitus.   8/28/24 Patient presents for follow up. Patient reports he is on a new diuretic. On exam: Lower extremity edema improved BLE:  superficial wounds with large amount of serous exudate dry scaling and flaking skin.    Stable erythema w/o warmth, tenderness, induration, fluctuance or crepitus.  s/p mechanical debridement of dry scaly skin  Wounds washed with soap and water Moisturizer applied  10-30-24 Pt here for f/u No new complaints.  Recently hospitalized for SOB.  t says he had PNA  Pt had vascular testing today (IVC).  Dr. Martins will call pt with results.  On exam: bilateral lower extremity superficial ulcerative wounds with sloughing and xerosis. macerated ay ankles.  s/p mechanical debridement. No s/s of infection  11-20-24: Pt here for f/u No new complaints States the home nurse only comes twice a week On exam: bilateral lower extremities continue with scattered superficial areas of denuded skin. dry flaky skin. S/p mechanical debridement. No s/s of infection  12-18-24: Pt here for f/u No new complaints Pt has nurse 2x/week.  telfa being used instead of adaptic On exam: BLE: superficial wounds with dry scaly skin.  s/p mechanical debridement No s/s of infection.   1-15-25: Pt here for f/u No new complaints Pt has nurse 2x/week.   Pt says nurse told him the supplies arent covered any more. using adaptic/abd/safia/ace On exam: BLE: superficial wounds with dry scaly skin.  s/p mechanical debridement No s/s of infection. less drainage and less dry skin  3-12-25: Pt here for f/u No new complaints Pt has nurse 2x/week  On exam: BLE with dry flaking skin RLE medial ankle with small superficial wound. s/p mechanical debridement No s/s of infection.  LLE (shin) with superficial wound. s/p mechanical debridement No s/s of infection.   4-9-25: Pt here for f/u No new complaints Has home 2x/week On exam: BLE:  hyperkeratotic skin with superficial wounds.  s/p mechanical debridement No s/s of infection.   5-7-25: Pt here for f/u No new complaints Has home care 2x/week On exam: BLE: 2 small superficial wounds on either shin. s/p mechanical debridement No s/s of infection.   6-4-25 Pt here for f/u No new complaints Has home care 2x/week On exam: RLE anterior small superficial wound. Dry flaky skin. s/p mechanical debridement No s/s of infection.  Left medial lower leg small superficial wound.  dry flaking skin. s/p mechanical debridement No s/s of infection.

## 2025-06-04 NOTE — HISTORY OF PRESENT ILLNESS
[FreeTextEntry1] : 6-19-24 Mr. GARLAND SLOAN   presents to the office with a wound for 6 months.  The wound is located on  the BLE.  The patient has complaints of non healing and drainage.   The patient has been dressing the wound with wash cloth and tape.  The patient denies fevers or chills.  The patient has localized pain to the wound upon dressing changes.  The patient has no other complaints or associated symptoms.   Pt treated with abx (doxy ) by PMD on 6-3-24. Pt has seen Dr. Mora's office ([facility]) last in 2-2024 and had VLE--  severe superficial venous insuff.  Recommended compression and elevation  Pt here for worsening wounds over last 6 months.   10-30-24 Pt presents for follow up for bilateral lower extremity wounds. He was recently hospitalized for pneumonia. He was seen by the wound care service there who recommended sonia to his wounds.   11-20-24 Pt presents for follow up of BLE wounds. He states that the nurses have only been coming twice a week. No new complaints.   6-4-25 Pt presents for follow up. No new issues. Home health nurse continues to come 2x/week

## 2025-06-04 NOTE — PLAN
[FreeTextEntry1] : 6-19-24: Plan: BLE: wash with soap and water adaptic/xtrasorb/safia/ace 3x/week supplies ordered  will order home care  will order vascular testing (MARCUS/PVR, VLE, IVC scan)  The patient was counseled as to the signs and symptoms of infection, and instructed in the event they suspect new or worsening infection or if the patient is significantly ill (fever, altered mental status, etc.), to present to the nearest ED.  f/u 1 week   7-10-24: Plan: BLE: wash with soap and water adaptic/xtrasorb/safia/ace 3x/week Nursing orders given The patient was counseled as to the signs and symptoms of infection, and instructed in the event they suspect new or worsening infection or if the patient is significantly ill (fever, altered mental status, etc.), to present to the nearest ED.  for VLE and IVC scan f/u 2-3 weeks   7-19-24: Plan: wash with soap and water adaptic/drawtex/abd/safia/ace 3x/week Nursing orders given The patient was counseled as to the signs and symptoms of infection, and instructed in the event they suspect new or worsening infection or if the patient is significantly ill (fever, altered mental status, etc.), to present to the nearest ED.  Pt has appt scheduled for 8-7-24 8-7-24: Plan: Pt open to  vascular testing.  Will reorder.   will order vascular testing (VLE, IVC scan)  wash with soap and water adaptic/drawtex/abd/safia/ace 3x/week Nursing orders given The patient was counseled as to the signs and symptoms of infection, and instructed in the event they suspect new or worsening infection or if the patient is significantly ill (fever, altered mental status, etc.), to present to the nearest ED.  f/u 2-3 weeks   8/28/24 Plan: Vascular testing today for VLE IVC scheduled for next visit Wash with soap and water Moisturize dry scaly areas Adaptic/drawtex/abd/safia/ace 3x/week Nursing orders given for [facility]  10-30-24: Plan: BLE Wash with soap and water Moisturize dry scaly areas Adaptic/drawtex/abd/safia/ace 3x/week Nursing orders given for [facility] follow up in 3-4 weeks  11-20-24: Plan: BLE cont current tx Wash with soap and water Moisturize dry scaly areas Adaptic/drawtex/abd/safia/ace 3x/week Nursing orders given for 3x/week  follow up in 3-4 weeks  12-18-24: Plan: BLE cont current tx Wash with soap and water Xerosis: Moisturize dry scaly areas.  ammonium lactate ordered to pharmacy Adaptic/drawtex/abd/safia/ace 3x/week Nursing orders given for 3x/week  follow up in 3-4 weeks  1-15-25: Plan: BLE wash with soap and water Moisturize intact skin. Do not put between toes.  adaptic/aquacel/gauze/safia/ace 3x/week Nursing orders given will get in contact with homecare f/u 3-4 weeks   3-12-25: Plan: BLE BLE wash with soap and water Moisturize intact skin. Do not put between toes.  adaptic/aquacel/gauze/safia/ace 2x/week Nursing orders given Pt does not want compression stockings.  f/u 2-3 weeks   4-9-35: Plan: BLE wounds:  wash with soap and water Moisturize intact skin. Do not put between toes.  adaptic/aquacel/gauze/safia/ace 2x/week Nursing orders given f/u 3-4 weeks   5-7-25: Plan: BLE wash with soap and water Moisturize intact skin. Do not put between toes.  adaptic/aquacel/gauze/safia/ace 2x/week Nursing orders given f/u 3-4 weeks   6-4-25 Plan: BLE wash with soap and water Moisturize intact skin. Do not put between toes.  adaptic/aquacel/gauze/safia/ace 2x/week Nursing orders given f/u 3-4 weeks

## 2025-06-04 NOTE — PHYSICAL EXAM
[1+] : left 1+ [Ankle Swelling (On Exam)] : present [Ankle Swelling Bilaterally] : bilaterally  [Skin Ulcer] : ulcer [Alert] : alert [Oriented to Person] : oriented to person [Oriented to Place] : oriented to place [Oriented to Time] : oriented to time [Calm] : calm [Please See PDF for [employer]] : Please See PDF for [employer]. [de-identified] : NAD [de-identified] : AT [de-identified] : supple [de-identified] : equal chest rise [de-identified] : FROM

## 2025-07-16 NOTE — PLAN
[FreeTextEntry1] : edema gone weight loss? reduce water pill to every other day full blood infl marker tumor markers increase calories

## 2025-07-16 NOTE — HISTORY OF PRESENT ILLNESS
[FreeTextEntry1] : f/u [de-identified] : Legs still weeping but all edema gone? legs wrapped eating fair losiong weight still on daily water pill with potassium